# Patient Record
Sex: FEMALE | Race: WHITE | Employment: OTHER | URBAN - METROPOLITAN AREA
[De-identification: names, ages, dates, MRNs, and addresses within clinical notes are randomized per-mention and may not be internally consistent; named-entity substitution may affect disease eponyms.]

---

## 2024-04-02 ENCOUNTER — HOSPITAL ENCOUNTER (OUTPATIENT)
Age: 49
Discharge: HOME | End: 2024-04-02
Payer: COMMERCIAL

## 2024-04-02 VITALS
HEART RATE: 65 BPM | OXYGEN SATURATION: 100 % | TEMPERATURE: 97 F | DIASTOLIC BLOOD PRESSURE: 79 MMHG | RESPIRATION RATE: 20 BRPM | SYSTOLIC BLOOD PRESSURE: 147 MMHG

## 2024-04-02 DIAGNOSIS — S61.211A LACERATION OF LEFT INDEX FINGER WITHOUT FOREIGN BODY WITHOUT DAMAGE TO NAIL, INITIAL ENCOUNTER: Primary | ICD-10-CM

## 2024-04-02 PROCEDURE — 99203 OFFICE O/P NEW LOW 30 MIN: CPT | Performed by: PHYSICIAN ASSISTANT

## 2024-04-02 NOTE — ED PROVIDER NOTES
Patient Seen in: [facility]      History   No chief complaint on file.    Stated Complaint: Laceration    Subjective:   HPI    Patient is a healthy 40-year-old female who presents to immediate care due to left index finger laceration.  Patient states last night she sustained a laceration of the distal index finger using a quilting blade.  States that she applied Band-Aid to the wound.  States that she noticed mild bleeding this morning which prompted her to come to immediate care for evaluation.  Denies limited range of motion, finger pain.  Tdap up-to-date.    Objective:   History reviewed. No pertinent past medical history.           No pertinent past surgical history.              No pertinent social history.            Review of Systems    Positive for stated complaint: Laceration  Other systems are as noted in HPI.  Constitutional and vital signs reviewed.      All other systems reviewed and negative except as noted above.    Physical Exam     ED Triage Vitals [04/02/24 1245]   /79   Pulse 65   Resp 20   Temp 97.1 °F (36.2 °C)   Temp src Temporal   SpO2 100 %   O2 Device None (Room air)       Current:/79   Pulse 65   Temp 97.1 °F (36.2 °C) (Temporal)   Resp 20   SpO2 100%         Physical Exam    Vital signs reviewed. Nursing note reviewed.  Constitutional: Well-developed. Well-nourished. In no acute distress  HENT: Mucous membranes moist.   EYES: No scleral icterus or conjunctival injection.  NECK: Full ROM. Supple.   CARDIAC: Normal rate.   PULM/CHEST: . No wheezes  Extremities: Full ROM of left hand and fingers.  Mild avulsion laceration of the distal aspect of the left index finger.  No nailbed involvement.  No active bleeding, foreign body appreciated.  NEURO: Awake, alert, following commands, moving extremities, answering questions.   SKIN: Warm and dry. No rash or lesions.  PSYCH: Normal judgment. Normal affect.                 [facility]      Patient is a 48-year-old female that  presents to immediate care due to left index laceration that she sustained last night.  Patient arrives with stable vitals.  Physical exam showing small avulsion laceration of the distal aspect of the left index finger.  Most likely superficial laceration, laceration occurred roughly 20 hours ago discussed with patient wound will heal by secondary intention.  Will clean wound with pressure wash prior to discharge, applied Neosporin and bandage prior to discharge.  Less likely underlying fracture or dislocation as patient has full range of motion and strength of left hand and finger.  Return precautions including worsening pain, finger edema erythema warmth.  History given by patient.  Patient expressed understanding all questions answered.                                   Medical Decision Making      Disposition and Plan     Clinical Impression:  1. Laceration of left index finger without foreign body without damage to nail, initial encounter         Disposition:  Discharge  4/2/2024  1:14 pm    Follow-up:  No follow-up provider specified.        Medications Prescribed:  There are no discharge medications for this patient.

## 2024-04-02 NOTE — ED INITIAL ASSESSMENT (HPI)
Pt states around 5pm 2nd finger of left hand with a quilting blade. Pt states off bandage this morning and was still bleeding.